# Patient Record
Sex: FEMALE | Race: WHITE
[De-identification: names, ages, dates, MRNs, and addresses within clinical notes are randomized per-mention and may not be internally consistent; named-entity substitution may affect disease eponyms.]

---

## 2020-02-18 ENCOUNTER — TRANSCRIPTION ENCOUNTER (OUTPATIENT)
Age: 60
End: 2020-02-18

## 2023-05-01 PROBLEM — Z00.00 ENCOUNTER FOR PREVENTIVE HEALTH EXAMINATION: Status: ACTIVE | Noted: 2023-05-01

## 2023-05-04 ENCOUNTER — NON-APPOINTMENT (OUTPATIENT)
Age: 63
End: 2023-05-04

## 2023-05-05 ENCOUNTER — NON-APPOINTMENT (OUTPATIENT)
Age: 63
End: 2023-05-05

## 2023-05-08 ENCOUNTER — NON-APPOINTMENT (OUTPATIENT)
Age: 63
End: 2023-05-08

## 2023-05-08 ENCOUNTER — APPOINTMENT (OUTPATIENT)
Dept: RADIATION ONCOLOGY | Facility: CLINIC | Age: 63
End: 2023-05-08
Payer: COMMERCIAL

## 2023-05-08 VITALS
HEART RATE: 80 BPM | RESPIRATION RATE: 19 BRPM | SYSTOLIC BLOOD PRESSURE: 132 MMHG | OXYGEN SATURATION: 99 % | WEIGHT: 177 LBS | DIASTOLIC BLOOD PRESSURE: 70 MMHG

## 2023-05-08 DIAGNOSIS — C50.512 MALIGNANT NEOPLASM OF LOWER-OUTER QUADRANT OF LEFT FEMALE BREAST: ICD-10-CM

## 2023-05-08 DIAGNOSIS — H04.129 DRY EYE SYNDROME OF UNSPECIFIED LACRIMAL GLAND: ICD-10-CM

## 2023-05-08 DIAGNOSIS — Z17.0 MALIGNANT NEOPLASM OF LOWER-OUTER QUADRANT OF LEFT FEMALE BREAST: ICD-10-CM

## 2023-05-08 DIAGNOSIS — Z80.7 FAMILY HISTORY OF OTHER MALIGNANT NEOPLASMS OF LYMPHOID, HEMATOPOIETIC AND RELATED TISSUES: ICD-10-CM

## 2023-05-08 DIAGNOSIS — Z80.3 FAMILY HISTORY OF MALIGNANT NEOPLASM OF BREAST: ICD-10-CM

## 2023-05-08 DIAGNOSIS — Z80.6 FAMILY HISTORY OF LEUKEMIA: ICD-10-CM

## 2023-05-08 PROCEDURE — 99204 OFFICE O/P NEW MOD 45 MIN: CPT

## 2023-05-08 NOTE — VITALS
[Maximal Pain Intensity: 3/10] : 3/10 [Least Pain Intensity: 0/10] : 0/10 [Pain Location: ___] : Pain Location: [unfilled] [OTC] : OTC [90: Able to carry normal activity; minor signs or symptoms of disease.] : 90: Able to carry normal activity; minor signs or symptoms of disease.  [Date: ____________] : Patient's last distress assessment performed on [unfilled]. [5 - Distress Level] : Distress Level: 5 [Referred Patient  to social work for follow-up] : Patient was referred to social work for follow-up [Patient given social work contact information and resource sheet] : Patient was given social work contact information and resource sheet

## 2023-05-08 NOTE — HISTORY OF PRESENT ILLNESS
[FreeTextEntry1] : The patient is a pleasant 62 year old female diagnosed with left breast cancer referred by Dr. Laura.\par \par Routine mammogram 4/13/23 showed cluster of indeterminate calcifications right upper outer quadrant. Stereotactic biopsy recommended. Left breast 6:00 spiculated mass- biopsy recommended.\par \par Biopsy  4/18/23 \par Right breast at 10:00 axis revealed cystic apocrine papillary hyperplasia ( fibrocystic changes) with oxalate type microcalcifications. \par Left breast 6:00 N5 Infiltrating Lobular carcinoma, moderately differentiated, at least 0.7 cm, present in multiple fragments. Grade 2 focal lobular carcinoma in situ, classic type. ER %, NE %, HER2 Negative.Ki-67 = 10%\par Left axillary lymph node biopsy benign. \par \par Breast MRI 5/5/23 showed  Left 6:00, N5 1.1 cm irregular enhancing mass, corresponding to biopsy proven invasive Lobular carcinoma. Right 7:00 N5 1.2 cm clumped nonmass enhancement, suspicious, non demonstrated on US, MRI biopsy recommended and R breast biopsy scheduled for 5/10/23. Left upper sternum 0.9 cm enhancing lesion is indeterminate. 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 Her son is getting  Memorial day weekend. 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  She walks for exercise and presents to discuss the role of radiotherapy in her care.\par \par \par

## 2023-05-08 NOTE — PHYSICAL EXAM
[General Appearance - Well Developed] : well developed [Symmetric] : breasts are symmetric [Breast Palpation Mass] : no palpable masses [Breast Abnormal Lactation (Galactorrhea)] : no nipple discharge [No UE Edema] : there is no upper extremity edema [Supraclavicular Lymph Nodes Enlarged Bilaterally] : supraclavicular [Axillary Lymph Nodes Enlarged Bilaterally] : axillary [Normal] : oriented to person, place and time, the affect was normal, the mood was normal and not anxious [Oriented To Time, Place, And Person] : oriented to person, place, and time [Affect] : the affect was normal [de-identified] : Size D

## 2023-05-14 ENCOUNTER — TRANSCRIPTION ENCOUNTER (OUTPATIENT)
Age: 63
End: 2023-05-14

## 2023-08-09 ENCOUNTER — INPATIENT (INPATIENT)
Facility: HOSPITAL | Age: 63
LOS: 1 days | Discharge: HOME HEALTH | DRG: 520 | End: 2023-08-11
Attending: FAMILY MEDICINE | Admitting: ORTHOPAEDIC SURGERY
Payer: COMMERCIAL

## 2023-08-09 VITALS — HEIGHT: 62 IN | WEIGHT: 175.05 LBS

## 2023-08-09 DIAGNOSIS — M54.9 DORSALGIA, UNSPECIFIED: ICD-10-CM

## 2023-08-09 DIAGNOSIS — Z98.890 OTHER SPECIFIED POSTPROCEDURAL STATES: Chronic | ICD-10-CM

## 2023-08-09 LAB
ALBUMIN SERPL ELPH-MCNC: 4.1 G/DL — SIGNIFICANT CHANGE UP (ref 3.3–5)
ALP SERPL-CCNC: 91 U/L — SIGNIFICANT CHANGE UP (ref 40–120)
ALT FLD-CCNC: 35 U/L — SIGNIFICANT CHANGE UP (ref 12–78)
ANION GAP SERPL CALC-SCNC: 5 MMOL/L — SIGNIFICANT CHANGE UP (ref 5–17)
APTT BLD: 30.5 SEC — SIGNIFICANT CHANGE UP (ref 24.5–35.6)
AST SERPL-CCNC: 15 U/L — SIGNIFICANT CHANGE UP (ref 15–37)
BASOPHILS # BLD AUTO: 0.05 K/UL — SIGNIFICANT CHANGE UP (ref 0–0.2)
BASOPHILS NFR BLD AUTO: 0.8 % — SIGNIFICANT CHANGE UP (ref 0–2)
BILIRUB SERPL-MCNC: 0.2 MG/DL — SIGNIFICANT CHANGE UP (ref 0.2–1.2)
BLD GP AB SCN SERPL QL: SIGNIFICANT CHANGE UP
BUN SERPL-MCNC: 23 MG/DL — SIGNIFICANT CHANGE UP (ref 7–23)
CALCIUM SERPL-MCNC: 9.4 MG/DL — SIGNIFICANT CHANGE UP (ref 8.5–10.1)
CHLORIDE SERPL-SCNC: 107 MMOL/L — SIGNIFICANT CHANGE UP (ref 96–108)
CO2 SERPL-SCNC: 28 MMOL/L — SIGNIFICANT CHANGE UP (ref 22–31)
CREAT SERPL-MCNC: 0.93 MG/DL — SIGNIFICANT CHANGE UP (ref 0.5–1.3)
EGFR: 70 ML/MIN/1.73M2 — SIGNIFICANT CHANGE UP
EOSINOPHIL # BLD AUTO: 0.19 K/UL — SIGNIFICANT CHANGE UP (ref 0–0.5)
EOSINOPHIL NFR BLD AUTO: 3 % — SIGNIFICANT CHANGE UP (ref 0–6)
GLUCOSE SERPL-MCNC: 86 MG/DL — SIGNIFICANT CHANGE UP (ref 70–99)
HCG SERPL-ACNC: 2 MIU/ML — SIGNIFICANT CHANGE UP
HCT VFR BLD CALC: 37.9 % — SIGNIFICANT CHANGE UP (ref 34.5–45)
HGB BLD-MCNC: 12.9 G/DL — SIGNIFICANT CHANGE UP (ref 11.5–15.5)
IMM GRANULOCYTES NFR BLD AUTO: 0.2 % — SIGNIFICANT CHANGE UP (ref 0–0.9)
INR BLD: 0.9 RATIO — SIGNIFICANT CHANGE UP (ref 0.85–1.18)
LYMPHOCYTES # BLD AUTO: 1.92 K/UL — SIGNIFICANT CHANGE UP (ref 1–3.3)
LYMPHOCYTES # BLD AUTO: 30.3 % — SIGNIFICANT CHANGE UP (ref 13–44)
MCHC RBC-ENTMCNC: 30.1 PG — SIGNIFICANT CHANGE UP (ref 27–34)
MCHC RBC-ENTMCNC: 34 GM/DL — SIGNIFICANT CHANGE UP (ref 32–36)
MCV RBC AUTO: 88.3 FL — SIGNIFICANT CHANGE UP (ref 80–100)
MONOCYTES # BLD AUTO: 0.4 K/UL — SIGNIFICANT CHANGE UP (ref 0–0.9)
MONOCYTES NFR BLD AUTO: 6.3 % — SIGNIFICANT CHANGE UP (ref 2–14)
NEUTROPHILS # BLD AUTO: 3.76 K/UL — SIGNIFICANT CHANGE UP (ref 1.8–7.4)
NEUTROPHILS NFR BLD AUTO: 59.4 % — SIGNIFICANT CHANGE UP (ref 43–77)
PLATELET # BLD AUTO: 274 K/UL — SIGNIFICANT CHANGE UP (ref 150–400)
POTASSIUM SERPL-MCNC: 4.2 MMOL/L — SIGNIFICANT CHANGE UP (ref 3.5–5.3)
POTASSIUM SERPL-SCNC: 4.2 MMOL/L — SIGNIFICANT CHANGE UP (ref 3.5–5.3)
PROT SERPL-MCNC: 7.8 GM/DL — SIGNIFICANT CHANGE UP (ref 6–8.3)
PROTHROM AB SERPL-ACNC: 10.2 SEC — SIGNIFICANT CHANGE UP (ref 9.5–13)
RBC # BLD: 4.29 M/UL — SIGNIFICANT CHANGE UP (ref 3.8–5.2)
RBC # FLD: 13 % — SIGNIFICANT CHANGE UP (ref 10.3–14.5)
SODIUM SERPL-SCNC: 140 MMOL/L — SIGNIFICANT CHANGE UP (ref 135–145)
WBC # BLD: 6.33 K/UL — SIGNIFICANT CHANGE UP (ref 3.8–10.5)
WBC # FLD AUTO: 6.33 K/UL — SIGNIFICANT CHANGE UP (ref 3.8–10.5)

## 2023-08-09 PROCEDURE — A9579: CPT

## 2023-08-09 PROCEDURE — 72158 MRI LUMBAR SPINE W/O & W/DYE: CPT | Mod: 26

## 2023-08-09 PROCEDURE — 86850 RBC ANTIBODY SCREEN: CPT

## 2023-08-09 PROCEDURE — 36415 COLL VENOUS BLD VENIPUNCTURE: CPT

## 2023-08-09 PROCEDURE — 85027 COMPLETE CBC AUTOMATED: CPT

## 2023-08-09 PROCEDURE — 85610 PROTHROMBIN TIME: CPT

## 2023-08-09 PROCEDURE — 88304 TISSUE EXAM BY PATHOLOGIST: CPT

## 2023-08-09 PROCEDURE — 76000 FLUOROSCOPY <1 HR PHYS/QHP: CPT

## 2023-08-09 PROCEDURE — 71045 X-RAY EXAM CHEST 1 VIEW: CPT | Mod: 26

## 2023-08-09 PROCEDURE — 71045 X-RAY EXAM CHEST 1 VIEW: CPT

## 2023-08-09 PROCEDURE — 72100 X-RAY EXAM L-S SPINE 2/3 VWS: CPT | Mod: 26

## 2023-08-09 PROCEDURE — 84702 CHORIONIC GONADOTROPIN TEST: CPT

## 2023-08-09 PROCEDURE — 85025 COMPLETE CBC W/AUTO DIFF WBC: CPT

## 2023-08-09 PROCEDURE — 93005 ELECTROCARDIOGRAM TRACING: CPT

## 2023-08-09 PROCEDURE — 80053 COMPREHEN METABOLIC PANEL: CPT

## 2023-08-09 PROCEDURE — 72158 MRI LUMBAR SPINE W/O & W/DYE: CPT | Mod: MA

## 2023-08-09 PROCEDURE — 85730 THROMBOPLASTIN TIME PARTIAL: CPT

## 2023-08-09 PROCEDURE — 99285 EMERGENCY DEPT VISIT HI MDM: CPT

## 2023-08-09 PROCEDURE — 86900 BLOOD TYPING SEROLOGIC ABO: CPT

## 2023-08-09 PROCEDURE — C1889: CPT

## 2023-08-09 PROCEDURE — 86803 HEPATITIS C AB TEST: CPT

## 2023-08-09 PROCEDURE — 80048 BASIC METABOLIC PNL TOTAL CA: CPT

## 2023-08-09 PROCEDURE — 72100 X-RAY EXAM L-S SPINE 2/3 VWS: CPT

## 2023-08-09 PROCEDURE — 86901 BLOOD TYPING SEROLOGIC RH(D): CPT

## 2023-08-09 RX ORDER — CYCLOSPORINE 0.5 MG/ML
1 EMULSION OPHTHALMIC
Refills: 0 | DISCHARGE

## 2023-08-09 RX ORDER — CHOLECALCIFEROL (VITAMIN D3) 125 MCG
1 CAPSULE ORAL
Refills: 0 | DISCHARGE

## 2023-08-09 RX ORDER — ACETAMINOPHEN 500 MG
1000 TABLET ORAL EVERY 8 HOURS
Refills: 0 | Status: DISCONTINUED | OUTPATIENT
Start: 2023-08-09 | End: 2023-08-11

## 2023-08-09 RX ORDER — ANASTROZOLE 1 MG/1
1 TABLET ORAL
Refills: 0 | DISCHARGE

## 2023-08-09 RX ORDER — MORPHINE SULFATE 50 MG/1
4 CAPSULE, EXTENDED RELEASE ORAL ONCE
Refills: 0 | Status: DISCONTINUED | OUTPATIENT
Start: 2023-08-09 | End: 2023-08-09

## 2023-08-09 RX ORDER — HYDROMORPHONE HYDROCHLORIDE 2 MG/ML
0.5 INJECTION INTRAMUSCULAR; INTRAVENOUS; SUBCUTANEOUS EVERY 4 HOURS
Refills: 0 | Status: DISCONTINUED | OUTPATIENT
Start: 2023-08-09 | End: 2023-08-11

## 2023-08-09 RX ORDER — PREGABALIN 225 MG/1
2 CAPSULE ORAL
Refills: 0 | DISCHARGE

## 2023-08-09 RX ORDER — CALCIUM CARBONATE 500(1250)
1 TABLET ORAL
Refills: 0 | DISCHARGE

## 2023-08-09 RX ORDER — CYCLOBENZAPRINE HYDROCHLORIDE 10 MG/1
5 TABLET, FILM COATED ORAL THREE TIMES A DAY
Refills: 0 | Status: DISCONTINUED | OUTPATIENT
Start: 2023-08-09 | End: 2023-08-11

## 2023-08-09 RX ORDER — ACETAMINOPHEN 500 MG
1000 TABLET ORAL ONCE
Refills: 0 | Status: COMPLETED | OUTPATIENT
Start: 2023-08-09 | End: 2023-08-09

## 2023-08-09 RX ORDER — MORPHINE SULFATE 50 MG/1
2 CAPSULE, EXTENDED RELEASE ORAL EVERY 6 HOURS
Refills: 0 | Status: DISCONTINUED | OUTPATIENT
Start: 2023-08-09 | End: 2023-08-09

## 2023-08-09 RX ORDER — OXYCODONE HYDROCHLORIDE 5 MG/1
5 TABLET ORAL EVERY 4 HOURS
Refills: 0 | Status: DISCONTINUED | OUTPATIENT
Start: 2023-08-09 | End: 2023-08-11

## 2023-08-09 RX ORDER — HYDROMORPHONE HYDROCHLORIDE 2 MG/ML
0.5 INJECTION INTRAMUSCULAR; INTRAVENOUS; SUBCUTANEOUS EVERY 4 HOURS
Refills: 0 | Status: DISCONTINUED | OUTPATIENT
Start: 2023-08-09 | End: 2023-08-09

## 2023-08-09 RX ORDER — OXYCODONE HYDROCHLORIDE 5 MG/1
10 TABLET ORAL EVERY 4 HOURS
Refills: 0 | Status: DISCONTINUED | OUTPATIENT
Start: 2023-08-09 | End: 2023-08-11

## 2023-08-09 RX ADMIN — Medication 1000 MILLIGRAM(S): at 15:00

## 2023-08-09 RX ADMIN — Medication 1000 MILLIGRAM(S): at 21:31

## 2023-08-09 RX ADMIN — MORPHINE SULFATE 2 MILLIGRAM(S): 50 CAPSULE, EXTENDED RELEASE ORAL at 15:56

## 2023-08-09 RX ADMIN — MORPHINE SULFATE 4 MILLIGRAM(S): 50 CAPSULE, EXTENDED RELEASE ORAL at 12:02

## 2023-08-09 RX ADMIN — MORPHINE SULFATE 4 MILLIGRAM(S): 50 CAPSULE, EXTENDED RELEASE ORAL at 14:20

## 2023-08-09 RX ADMIN — CYCLOBENZAPRINE HYDROCHLORIDE 5 MILLIGRAM(S): 10 TABLET, FILM COATED ORAL at 18:22

## 2023-08-09 RX ADMIN — HYDROMORPHONE HYDROCHLORIDE 0.5 MILLIGRAM(S): 2 INJECTION INTRAMUSCULAR; INTRAVENOUS; SUBCUTANEOUS at 21:46

## 2023-08-09 RX ADMIN — HYDROMORPHONE HYDROCHLORIDE 0.5 MILLIGRAM(S): 2 INJECTION INTRAMUSCULAR; INTRAVENOUS; SUBCUTANEOUS at 21:31

## 2023-08-09 RX ADMIN — MORPHINE SULFATE 2 MILLIGRAM(S): 50 CAPSULE, EXTENDED RELEASE ORAL at 15:41

## 2023-08-09 RX ADMIN — OXYCODONE HYDROCHLORIDE 10 MILLIGRAM(S): 5 TABLET ORAL at 18:59

## 2023-08-09 RX ADMIN — Medication 1000 MILLIGRAM(S): at 22:01

## 2023-08-09 RX ADMIN — Medication 400 MILLIGRAM(S): at 14:28

## 2023-08-09 RX ADMIN — OXYCODONE HYDROCHLORIDE 10 MILLIGRAM(S): 5 TABLET ORAL at 18:21

## 2023-08-09 NOTE — H&P ADULT - NSHPLABSRESULTS_GEN_ALL_CORE
VITAL SIGNS:  T(C): 36.8 (08-09-23 @ 09:39), Max: 36.8 (08-09-23 @ 09:39)  HR: 78 (08-09-23 @ 09:39) (78 - 78)  BP: 145/75 (08-09-23 @ 09:39) (145/75 - 145/75)  RR: 18 (08-09-23 @ 09:39) (18 - 18)  SpO2: 100% (08-09-23 @ 09:39) (100% - 100%)

## 2023-08-09 NOTE — ED ADULT TRIAGE NOTE - CHIEF COMPLAINT QUOTE
pt sent to ed by dr veloz for pain control ,MRI and admission. pt reports she has herniated disk with severe lumbar pain unrelieved by Percocet. denies urinary issues.

## 2023-08-09 NOTE — H&P ADULT - NSHPPHYSICALEXAM_GEN_ALL_CORE
PHYSICAL EXAM  GEN: NAD, AAOx3  SPINE:  Skin intact  TTP over the Right-sided paraspinal musculature of the lumbar spine; Non-TTP over the Left-sided paraspinal musculature of the lumbar spine or bilateral cervical or thoracic spine.   Non-TTP over the bony prominences of the cervical, thoracic, or lumbar spine.   No bony step-offs.   Grossly moving all extremities; SILT throughout all extremities.   + Radial pulses bilaterally; + DP/PT pulses bilaterally.   No saddle anesthesia.     MOTOR EXAM:                          Elbow Flex (C5)     Wrist Ext (C6)     Elbow Ext (C7)      Finger Flex (C8)    Finger Abduction (T1)  RIGHT                 5/5                         5/5                         5/5                          5/5                              5/5  LEFT                    5/5                         5/5                         5/5                          5/5                              5/5                           Hip Flex (L2)      Knee Ext (L3)      Ank Dorsiflex (L4)     Hallux Ext (L5)     Ank PlantarFlex (S1)  RIGHT               4/5                      4/5                          5/5                            5/5                           5/5  LEFT                  5/5                      5/5                          5/5                            5/5                           5/5      SENSORY EXAM:                        C5      C6      C7      C8       T1          RIGHT          2         2        2         2         2          (0=absent, 1=impaired, 2=normal, NT=not testable)  LEFT             2         2        2         2         2          (0=absent, 1=impaired, 2=normal, NT=not testable)                        L2        L3       L4      L5       S1          RIGHT        2          2         2        2        2           (0=absent, 1=impaired, 2=normal, NT=not testable)  LEFT           2          2         2        2        2           (0=absent, 1=impaired, 2=normal, NT=not testable)    Negative Anderson's. Babinski, and Myoclonus bilaterally. PHYSICAL EXAM  GEN: NAD, AAOx3  SPINE:  Skin intact  TTP over the Right-sided paraspinal musculature of the lumbar spine; Non-TTP over the Left-sided paraspinal musculature of the lumbar spine or bilateral cervical or thoracic spine.   Non-TTP over the bony prominences of the cervical, thoracic, or lumbar spine.   No bony step-offs.   Grossly moving all extremities; SILT throughout all extremities.   + Radial pulses bilaterally; + DP/PT pulses bilaterally.   No saddle anesthesia.     MOTOR EXAM:                          Elbow Flex (C5)     Wrist Ext (C6)     Elbow Ext (C7)      Finger Flex (C8)    Finger Abduction (T1)  RIGHT                 5/5                         5/5                         5/5                          5/5                              5/5  LEFT                    5/5                         5/5                         5/5                          5/5                              5/5                           Hip Flex (L2)      Knee Ext (L3)      Ank Dorsiflex (L4)     Hallux Ext (L5)     Ank PlantarFlex (S1)  RIGHT               4/5                      4/5                          5/5                            5/5                           5/5  LEFT                  5/5                      5/5                          5/5                            5/5                           5/5      SENSORY EXAM:                        C5      C6      C7      C8       T1          RIGHT          2         2        2         2         2          (0=absent, 1=impaired, 2=normal, NT=not testable)  LEFT             2         2        2         2         2          (0=absent, 1=impaired, 2=normal, NT=not testable)                        L2        L3       L4      L5       S1          RIGHT        2          2         2        1        1           (0=absent, 1=impaired, 2=normal, NT=not testable)  LEFT           2          2         2        2        2           (0=absent, 1=impaired, 2=normal, NT=not testable)    Negative Anderson's. Babinski, and Myoclonus bilaterally.

## 2023-08-09 NOTE — ED PROVIDER NOTE - NS ED MD TWO NIGHTS YN
Please ask patient to increase hydrochlorothiazide to two pills a day and then have him come back in two weeks for a recheck, he will also need labs at the time. Thanks   Yes

## 2023-08-09 NOTE — ED STATDOCS - OBJECTIVE STATEMENT
62 yo f with back pain sdent to ed by her spine specialist Dr. Mcleod.  Patient having acute on chronic back pain and sent in for mri and possible OR. No bowel or bladder incontinence.

## 2023-08-09 NOTE — ED STATDOCS - CLINICAL SUMMARY MEDICAL DECISION MAKING FREE TEXT BOX
Middle aged f sent to the ed for admission, mri and pain management. Ortho already at bedside, will obtain admissions labs,. pain meds and admitt.

## 2023-08-09 NOTE — ED ADULT NURSE NOTE - NSFALLHARMRISKINTERV_ED_ALL_ED

## 2023-08-09 NOTE — PATIENT PROFILE ADULT - VISION (WITH CORRECTIVE LENSES IF THE PATIENT USUALLY WEARS THEM):
wears glasses for everything/Severely impaired: cannot locate objects without hearing or touching them or patient nonresponsive.

## 2023-08-09 NOTE — PATIENT PROFILE ADULT - FALL HARM RISK - HARM RISK INTERVENTIONS
Assistance with ambulation/Assistance OOB with selected safe patient handling equipment/Communicate Risk of Fall with Harm to all staff/Discuss with provider need for PT consult/Monitor gait and stability/Reinforce activity limits and safety measures with patient and family/Review medications for side effects contributing to fall risk/Sit up slowly, dangle for a short time, stand at bedside before walking/Tailored Fall Risk Interventions/Toileting schedule using arm’s reach rule for commode and bathroom/Visual Cue: Yellow wristband and red socks/Bed in lowest position, wheels locked, appropriate side rails in place/Call bell, personal items and telephone in reach/Instruct patient to call for assistance before getting out of bed or chair/Non-slip footwear when patient is out of bed/[place] to call system/Physically safe environment - no spills, clutter or unnecessary equipment/Purposeful Proactive Rounding/Room/bathroom lighting operational, light cord in reach

## 2023-08-09 NOTE — ED ADULT NURSE NOTE - OBJECTIVE STATEMENT
patient came to ED, from physician office for back pain not being controlled  at home with pain medications.

## 2023-08-09 NOTE — H&P ADULT - HISTORY OF PRESENT ILLNESS
Orthopaedic Surgery - Spine Service     Patient is a 63F community-ambulator without assistive devices who presents to the [facility] ED with a chief complaint of Right-sided lower back pain radiating down the Right leg. Patient states that she has been having approximately four weeks of unremitting lower Right-sided back pain and "burning" Right buttock pain radiating down the back of her thigh and calf to her foot. Of note, patient bears a recent diagnosis of breast cancer (diagnosed in April 2023) and is status-post bilateral mastectomies and JAMIR (June 2023). Patient states she experienced significant position-related (recumbent)  back stiffness throughout her postoperative recovery which she believes exacerbated her prior spinal pathology. Patient elaborates that she sustained a Right-sided L3-L4 herniated disc in 1998 and subsequently underwent a successful microdiscectomy. Patient has had no back-pain related issues in the interim until the onset of current symptoms described above pertaining to her current presentation. Patient states that her prior presentation did not include radiation to the foot whereas her current presentation does. Patient presents with the compact disc for an outpatient MR Lumbar Spine study (without contrast) from 8/4/23; patient states she was informed that the study demonstrated a recurrent Right-sided L3-L4 disc herniation. Patient denies any recent or frequent falls, head-strikes, losses of consciousness, Patient unable to identify any perceived traumas or exertional stressors that she believes could have precipitated re-herniation. Patient remains able to ambulate independently; however, states pain is worse with ambulation, particularly when descending stairs. Patient denies weakness, numbness, or tingling accompanying the aforementioned radicular pain in the Right lower extremity; patient otherwise denies pain, weakness, numbness, or tingling in the bilateral upper and Left lower extremities. Denies having any other pain elsewhere. Denies any previous orthopaedic history aside from remote outpatient follow-up for an arthritic Right shoulder, which is not currently bothering. Denies fevers, chills, headaches, chest pain, shortness of breath, nausea, vomiting, urinary or fecal incontinence, saddle anesthesia, or any additional orthopaedic concern or complaints at this time.     Orthopaedic Surgery - Spine Service     Patient is a 63F community-ambulator without assistive devices who presents to the [facility] ED with a chief complaint of Right-sided lower back pain radiating down the Right leg. Patient states that she has been having approximately four weeks of unremitting lower Right-sided back pain and "burning" Right buttock pain radiating down the back of her thigh and calf to her foot. Of note, patient bears a recent diagnosis of breast cancer (diagnosed in April 2023) and is status-post bilateral mastectomies and JAMIR (June 2023). Patient states she experienced significant position-related (recumbent)  back stiffness throughout her postoperative recovery which she believes exacerbated her prior spinal pathology. Patient elaborates that she sustained a Right-sided L3-L4 herniated disc in 1998 and subsequently underwent a successful microdiscectomy. Patient has had no back-pain related issues in the interim until the onset of current symptoms described above pertaining to her current presentation. Patient states that her prior presentation did not include radiation to the foot whereas her current presentation does. Patient presents with the compact disc for an outpatient MR Lumbar Spine study (without contrast) from 8/4/23; patient states she was informed that the study demonstrated a lumbar disc herniation. Patient denies any recent or frequent falls, head-strikes, losses of consciousness, Patient unable to identify any perceived traumas or exertional stressors that she believes could have precipitated re-herniation. Patient remains able to ambulate independently; however, states pain is worse with ambulation, particularly when descending stairs. Patient denies weakness, numbness, or tingling accompanying the aforementioned radicular pain in the Right lower extremity; patient otherwise denies pain, weakness, numbness, or tingling in the bilateral upper and Left lower extremities. Denies having any other pain elsewhere. Denies any previous orthopaedic history aside from remote outpatient follow-up for an arthritic Right shoulder, which is not currently bothering. Denies fevers, chills, headaches, chest pain, shortness of breath, nausea, vomiting, urinary or fecal incontinence, saddle anesthesia, or any additional orthopaedic concern or complaints at this time.

## 2023-08-09 NOTE — H&P ADULT - ASSESSMENT
ASSESSMENT:  Patient is a 63F with unremitting Right-sided lower back and Right lower extremity radicular pain with a recurrent L3-4 herniated disc reported on outpatient MR Lumbar Spine imaging.       PLAN:  - Admit to Orthopaedic Spine Service (Dr. Mcleod).   - Follow up MR Lumbar Spine WITH Contrast.   - Pain control.   - NPO after except medications pending MRI.   - Please hold chemical DVT Ppx; Venodynes may be used.   - CBC/BMP/PT/PTT/T&S/ECG/CXR.   - No acute orthopaedic surgical intervention indicated at this time; Anticipate likely OR.   - All questions answered and concerns addressed to patient's satisfaction. Patient/family understand and agree with plan.  - Will discuss with Shani and advise if plan changes.     ASSESSMENT:  Patient is a 63F with unremitting Right-sided lower back and Right lower extremity radicular pain with a L5-S1 herniated disc reported on outpatient MR Lumbar Spine imaging.       PLAN:  - Admit to Orthopaedic Spine Service (Dr. Mcleod).   - Follow up MR Lumbar Spine WITH Contrast.   - Plan for OR on Friday 8/11 for surgical intervention.   - Please document Medical/Other clearance(s)/optimization as needed.   - Pain control.   - Patient ambulatory, no DVT prophylaxis required at present.   - WBAT.   - CBC/BMP/PT/PTT/T&S/ECG/CXR.   - All questions answered and concerns addressed to patient's satisfaction. Patient/family understand and agree with plan.  - Will discuss with Shani and advise if plan changes.

## 2023-08-09 NOTE — ED STATDOCS - PHYSICAL EXAMINATION
Improved
Constitutional: NAD   Eyes: PERRLA  Head: Normocephalic   Mouth: MMM  Cardiac: regular rate   Resp: Lungs CTAB  GI: Abd s/nt/nd, no rebound or guarding.  Neuro: awake, alert, moving all extremities  Skin: No rashes  msk: spine mildly tender to palpation, ambulates with steady gait

## 2023-08-09 NOTE — ED STATDOCS - PROGRESS NOTE DETAILS
62 yo female with a PMH of chronic back pain, herniated disks presents with worsening lower back pain. Pt had a MRI on friday without contrast which showed her L spine herniated disk. Pt f/u with Dr. Mcleod from ortho spine and was advised to come in for a MRI with contrast and admission for her pain.   Ortho at bedside examining pt. Pt to be admitted to Dr. Mcleod. -Kane Parker PA-C

## 2023-08-10 LAB
ANION GAP SERPL CALC-SCNC: 5 MMOL/L — SIGNIFICANT CHANGE UP (ref 5–17)
APTT BLD: 31.7 SEC — SIGNIFICANT CHANGE UP (ref 24.5–35.6)
BUN SERPL-MCNC: 25 MG/DL — HIGH (ref 7–23)
CALCIUM SERPL-MCNC: 9.1 MG/DL — SIGNIFICANT CHANGE UP (ref 8.5–10.1)
CHLORIDE SERPL-SCNC: 107 MMOL/L — SIGNIFICANT CHANGE UP (ref 96–108)
CO2 SERPL-SCNC: 27 MMOL/L — SIGNIFICANT CHANGE UP (ref 22–31)
CREAT SERPL-MCNC: 0.75 MG/DL — SIGNIFICANT CHANGE UP (ref 0.5–1.3)
EGFR: 91 ML/MIN/1.73M2 — SIGNIFICANT CHANGE UP
GLUCOSE SERPL-MCNC: 96 MG/DL — SIGNIFICANT CHANGE UP (ref 70–99)
HCT VFR BLD CALC: 37.2 % — SIGNIFICANT CHANGE UP (ref 34.5–45)
HCV AB S/CO SERPL IA: 0.15 S/CO — SIGNIFICANT CHANGE UP (ref 0–0.99)
HCV AB SERPL-IMP: SIGNIFICANT CHANGE UP
HGB BLD-MCNC: 12.5 G/DL — SIGNIFICANT CHANGE UP (ref 11.5–15.5)
INR BLD: 0.99 RATIO — SIGNIFICANT CHANGE UP (ref 0.85–1.18)
MCHC RBC-ENTMCNC: 29.6 PG — SIGNIFICANT CHANGE UP (ref 27–34)
MCHC RBC-ENTMCNC: 33.6 GM/DL — SIGNIFICANT CHANGE UP (ref 32–36)
MCV RBC AUTO: 87.9 FL — SIGNIFICANT CHANGE UP (ref 80–100)
PLATELET # BLD AUTO: 243 K/UL — SIGNIFICANT CHANGE UP (ref 150–400)
POTASSIUM SERPL-MCNC: 4 MMOL/L — SIGNIFICANT CHANGE UP (ref 3.5–5.3)
POTASSIUM SERPL-SCNC: 4 MMOL/L — SIGNIFICANT CHANGE UP (ref 3.5–5.3)
PROTHROM AB SERPL-ACNC: 11.2 SEC — SIGNIFICANT CHANGE UP (ref 9.5–13)
RBC # BLD: 4.23 M/UL — SIGNIFICANT CHANGE UP (ref 3.8–5.2)
RBC # FLD: 13.1 % — SIGNIFICANT CHANGE UP (ref 10.3–14.5)
SODIUM SERPL-SCNC: 139 MMOL/L — SIGNIFICANT CHANGE UP (ref 135–145)
WBC # BLD: 5.81 K/UL — SIGNIFICANT CHANGE UP (ref 3.8–10.5)
WBC # FLD AUTO: 5.81 K/UL — SIGNIFICANT CHANGE UP (ref 3.8–10.5)

## 2023-08-10 PROCEDURE — 99233 SBSQ HOSP IP/OBS HIGH 50: CPT

## 2023-08-10 PROCEDURE — 93010 ELECTROCARDIOGRAM REPORT: CPT

## 2023-08-10 RX ORDER — OXYCODONE HYDROCHLORIDE 5 MG/1
10 TABLET ORAL ONCE
Refills: 0 | Status: DISCONTINUED | OUTPATIENT
Start: 2023-08-11 | End: 2023-08-11

## 2023-08-10 RX ORDER — GABAPENTIN 400 MG/1
300 CAPSULE ORAL ONCE
Refills: 0 | Status: COMPLETED | OUTPATIENT
Start: 2023-08-11 | End: 2023-08-11

## 2023-08-10 RX ORDER — TRANEXAMIC ACID 100 MG/ML
1 INJECTION, SOLUTION INTRAVENOUS
Qty: 1000 | Refills: 0 | Status: DISCONTINUED | OUTPATIENT
Start: 2023-08-11 | End: 2023-08-11

## 2023-08-10 RX ORDER — CELECOXIB 200 MG/1
200 CAPSULE ORAL ONCE
Refills: 0 | Status: COMPLETED | OUTPATIENT
Start: 2023-08-11 | End: 2023-08-11

## 2023-08-10 RX ORDER — TRANEXAMIC ACID 100 MG/ML
1000 INJECTION, SOLUTION INTRAVENOUS ONCE
Refills: 0 | Status: DISCONTINUED | OUTPATIENT
Start: 2023-08-11 | End: 2023-08-11

## 2023-08-10 RX ORDER — SENNA PLUS 8.6 MG/1
1 TABLET ORAL DAILY
Refills: 0 | Status: DISCONTINUED | OUTPATIENT
Start: 2023-08-10 | End: 2023-08-11

## 2023-08-10 RX ORDER — POLYETHYLENE GLYCOL 3350 17 G/17G
17 POWDER, FOR SOLUTION ORAL DAILY
Refills: 0 | Status: DISCONTINUED | OUTPATIENT
Start: 2023-08-10 | End: 2023-08-11

## 2023-08-10 RX ADMIN — HYDROMORPHONE HYDROCHLORIDE 0.5 MILLIGRAM(S): 2 INJECTION INTRAMUSCULAR; INTRAVENOUS; SUBCUTANEOUS at 12:33

## 2023-08-10 RX ADMIN — Medication 1000 MILLIGRAM(S): at 06:29

## 2023-08-10 RX ADMIN — POLYETHYLENE GLYCOL 3350 17 GRAM(S): 17 POWDER, FOR SOLUTION ORAL at 13:07

## 2023-08-10 RX ADMIN — HYDROMORPHONE HYDROCHLORIDE 0.5 MILLIGRAM(S): 2 INJECTION INTRAMUSCULAR; INTRAVENOUS; SUBCUTANEOUS at 08:06

## 2023-08-10 RX ADMIN — OXYCODONE HYDROCHLORIDE 10 MILLIGRAM(S): 5 TABLET ORAL at 01:27

## 2023-08-10 RX ADMIN — OXYCODONE HYDROCHLORIDE 10 MILLIGRAM(S): 5 TABLET ORAL at 10:35

## 2023-08-10 RX ADMIN — Medication 1000 MILLIGRAM(S): at 14:44

## 2023-08-10 RX ADMIN — Medication 1000 MILLIGRAM(S): at 21:36

## 2023-08-10 RX ADMIN — HYDROMORPHONE HYDROCHLORIDE 0.5 MILLIGRAM(S): 2 INJECTION INTRAMUSCULAR; INTRAVENOUS; SUBCUTANEOUS at 14:48

## 2023-08-10 RX ADMIN — OXYCODONE HYDROCHLORIDE 10 MILLIGRAM(S): 5 TABLET ORAL at 20:50

## 2023-08-10 RX ADMIN — Medication 1000 MILLIGRAM(S): at 14:14

## 2023-08-10 RX ADMIN — OXYCODONE HYDROCHLORIDE 10 MILLIGRAM(S): 5 TABLET ORAL at 10:20

## 2023-08-10 RX ADMIN — SENNA PLUS 1 TABLET(S): 8.6 TABLET ORAL at 13:07

## 2023-08-10 RX ADMIN — Medication 1000 MILLIGRAM(S): at 06:30

## 2023-08-10 RX ADMIN — OXYCODONE HYDROCHLORIDE 10 MILLIGRAM(S): 5 TABLET ORAL at 20:20

## 2023-08-10 RX ADMIN — CYCLOBENZAPRINE HYDROCHLORIDE 5 MILLIGRAM(S): 10 TABLET, FILM COATED ORAL at 21:36

## 2023-08-10 RX ADMIN — HYDROMORPHONE HYDROCHLORIDE 0.5 MILLIGRAM(S): 2 INJECTION INTRAMUSCULAR; INTRAVENOUS; SUBCUTANEOUS at 08:21

## 2023-08-10 RX ADMIN — OXYCODONE HYDROCHLORIDE 10 MILLIGRAM(S): 5 TABLET ORAL at 04:44

## 2023-08-10 RX ADMIN — OXYCODONE HYDROCHLORIDE 10 MILLIGRAM(S): 5 TABLET ORAL at 00:57

## 2023-08-10 RX ADMIN — HYDROMORPHONE HYDROCHLORIDE 0.5 MILLIGRAM(S): 2 INJECTION INTRAMUSCULAR; INTRAVENOUS; SUBCUTANEOUS at 18:33

## 2023-08-10 RX ADMIN — OXYCODONE HYDROCHLORIDE 10 MILLIGRAM(S): 5 TABLET ORAL at 05:14

## 2023-08-10 RX ADMIN — HYDROMORPHONE HYDROCHLORIDE 0.5 MILLIGRAM(S): 2 INJECTION INTRAMUSCULAR; INTRAVENOUS; SUBCUTANEOUS at 12:48

## 2023-08-10 RX ADMIN — CYCLOBENZAPRINE HYDROCHLORIDE 5 MILLIGRAM(S): 10 TABLET, FILM COATED ORAL at 13:05

## 2023-08-11 ENCOUNTER — TRANSCRIPTION ENCOUNTER (OUTPATIENT)
Age: 63
End: 2023-08-11

## 2023-08-11 VITALS
HEART RATE: 82 BPM | RESPIRATION RATE: 17 BRPM | SYSTOLIC BLOOD PRESSURE: 154 MMHG | DIASTOLIC BLOOD PRESSURE: 75 MMHG | OXYGEN SATURATION: 92 % | TEMPERATURE: 98 F

## 2023-08-11 LAB
ANION GAP SERPL CALC-SCNC: 3 MMOL/L — LOW (ref 5–17)
ANION GAP SERPL CALC-SCNC: 6 MMOL/L — SIGNIFICANT CHANGE UP (ref 5–17)
APTT BLD: 30.3 SEC — SIGNIFICANT CHANGE UP (ref 24.5–35.6)
BUN SERPL-MCNC: 25 MG/DL — HIGH (ref 7–23)
BUN SERPL-MCNC: 29 MG/DL — HIGH (ref 7–23)
CALCIUM SERPL-MCNC: 8.6 MG/DL — SIGNIFICANT CHANGE UP (ref 8.5–10.1)
CALCIUM SERPL-MCNC: 9.1 MG/DL — SIGNIFICANT CHANGE UP (ref 8.5–10.1)
CHLORIDE SERPL-SCNC: 105 MMOL/L — SIGNIFICANT CHANGE UP (ref 96–108)
CHLORIDE SERPL-SCNC: 107 MMOL/L — SIGNIFICANT CHANGE UP (ref 96–108)
CO2 SERPL-SCNC: 26 MMOL/L — SIGNIFICANT CHANGE UP (ref 22–31)
CO2 SERPL-SCNC: 27 MMOL/L — SIGNIFICANT CHANGE UP (ref 22–31)
CREAT SERPL-MCNC: 0.81 MG/DL — SIGNIFICANT CHANGE UP (ref 0.5–1.3)
CREAT SERPL-MCNC: 0.98 MG/DL — SIGNIFICANT CHANGE UP (ref 0.5–1.3)
EGFR: 66 ML/MIN/1.73M2 — SIGNIFICANT CHANGE UP
EGFR: 83 ML/MIN/1.73M2 — SIGNIFICANT CHANGE UP
GLUCOSE SERPL-MCNC: 101 MG/DL — HIGH (ref 70–99)
GLUCOSE SERPL-MCNC: 133 MG/DL — HIGH (ref 70–99)
HCT VFR BLD CALC: 35.4 % — SIGNIFICANT CHANGE UP (ref 34.5–45)
HCT VFR BLD CALC: 39.6 % — SIGNIFICANT CHANGE UP (ref 34.5–45)
HGB BLD-MCNC: 12 G/DL — SIGNIFICANT CHANGE UP (ref 11.5–15.5)
HGB BLD-MCNC: 13.5 G/DL — SIGNIFICANT CHANGE UP (ref 11.5–15.5)
INR BLD: 1.01 RATIO — SIGNIFICANT CHANGE UP (ref 0.85–1.18)
MCHC RBC-ENTMCNC: 29.5 PG — SIGNIFICANT CHANGE UP (ref 27–34)
MCHC RBC-ENTMCNC: 29.6 PG — SIGNIFICANT CHANGE UP (ref 27–34)
MCHC RBC-ENTMCNC: 33.9 GM/DL — SIGNIFICANT CHANGE UP (ref 32–36)
MCHC RBC-ENTMCNC: 34.1 GM/DL — SIGNIFICANT CHANGE UP (ref 32–36)
MCV RBC AUTO: 86.8 FL — SIGNIFICANT CHANGE UP (ref 80–100)
MCV RBC AUTO: 87 FL — SIGNIFICANT CHANGE UP (ref 80–100)
PLATELET # BLD AUTO: 239 K/UL — SIGNIFICANT CHANGE UP (ref 150–400)
PLATELET # BLD AUTO: 240 K/UL — SIGNIFICANT CHANGE UP (ref 150–400)
POTASSIUM SERPL-MCNC: 3.9 MMOL/L — SIGNIFICANT CHANGE UP (ref 3.5–5.3)
POTASSIUM SERPL-MCNC: 4 MMOL/L — SIGNIFICANT CHANGE UP (ref 3.5–5.3)
POTASSIUM SERPL-SCNC: 3.9 MMOL/L — SIGNIFICANT CHANGE UP (ref 3.5–5.3)
POTASSIUM SERPL-SCNC: 4 MMOL/L — SIGNIFICANT CHANGE UP (ref 3.5–5.3)
PROTHROM AB SERPL-ACNC: 11.4 SEC — SIGNIFICANT CHANGE UP (ref 9.5–13)
RBC # BLD: 4.07 M/UL — SIGNIFICANT CHANGE UP (ref 3.8–5.2)
RBC # BLD: 4.56 M/UL — SIGNIFICANT CHANGE UP (ref 3.8–5.2)
RBC # FLD: 12.6 % — SIGNIFICANT CHANGE UP (ref 10.3–14.5)
RBC # FLD: 12.7 % — SIGNIFICANT CHANGE UP (ref 10.3–14.5)
SODIUM SERPL-SCNC: 137 MMOL/L — SIGNIFICANT CHANGE UP (ref 135–145)
SODIUM SERPL-SCNC: 137 MMOL/L — SIGNIFICANT CHANGE UP (ref 135–145)
WBC # BLD: 4.95 K/UL — SIGNIFICANT CHANGE UP (ref 3.8–10.5)
WBC # BLD: 7 K/UL — SIGNIFICANT CHANGE UP (ref 3.8–10.5)
WBC # FLD AUTO: 4.95 K/UL — SIGNIFICANT CHANGE UP (ref 3.8–10.5)
WBC # FLD AUTO: 7 K/UL — SIGNIFICANT CHANGE UP (ref 3.8–10.5)

## 2023-08-11 PROCEDURE — 99232 SBSQ HOSP IP/OBS MODERATE 35: CPT

## 2023-08-11 PROCEDURE — 88304 TISSUE EXAM BY PATHOLOGIST: CPT | Mod: 26

## 2023-08-11 RX ORDER — TRAMADOL HYDROCHLORIDE 50 MG/1
50 TABLET ORAL EVERY 6 HOURS
Refills: 0 | Status: DISCONTINUED | OUTPATIENT
Start: 2023-08-11 | End: 2023-08-11

## 2023-08-11 RX ORDER — ACETAMINOPHEN 500 MG
1000 TABLET ORAL ONCE
Refills: 0 | Status: COMPLETED | OUTPATIENT
Start: 2023-08-11 | End: 2023-08-11

## 2023-08-11 RX ORDER — OXYCODONE HYDROCHLORIDE 5 MG/1
1 TABLET ORAL
Qty: 20 | Refills: 0
Start: 2023-08-11 | End: 2023-08-15

## 2023-08-11 RX ORDER — CEFAZOLIN SODIUM 1 G
2000 VIAL (EA) INJECTION EVERY 8 HOURS
Refills: 0 | Status: COMPLETED | OUTPATIENT
Start: 2023-08-11 | End: 2023-08-12

## 2023-08-11 RX ORDER — FENTANYL CITRATE 50 UG/ML
50 INJECTION INTRAVENOUS
Refills: 0 | Status: DISCONTINUED | OUTPATIENT
Start: 2023-08-11 | End: 2023-08-11

## 2023-08-11 RX ORDER — HYDROMORPHONE HYDROCHLORIDE 2 MG/ML
0.5 INJECTION INTRAMUSCULAR; INTRAVENOUS; SUBCUTANEOUS
Refills: 0 | Status: DISCONTINUED | OUTPATIENT
Start: 2023-08-11 | End: 2023-08-11

## 2023-08-11 RX ORDER — MUPIROCIN 20 MG/G
1 OINTMENT TOPICAL
Refills: 0 | DISCHARGE

## 2023-08-11 RX ORDER — CELECOXIB 200 MG/1
1 CAPSULE ORAL
Refills: 0 | DISCHARGE

## 2023-08-11 RX ORDER — SODIUM CHLORIDE 9 MG/ML
1000 INJECTION, SOLUTION INTRAVENOUS
Refills: 0 | Status: DISCONTINUED | OUTPATIENT
Start: 2023-08-11 | End: 2023-08-11

## 2023-08-11 RX ORDER — ONDANSETRON 8 MG/1
4 TABLET, FILM COATED ORAL ONCE
Refills: 0 | Status: DISCONTINUED | OUTPATIENT
Start: 2023-08-11 | End: 2023-08-11

## 2023-08-11 RX ORDER — DOCUSATE SODIUM 100 MG
1 CAPSULE ORAL
Qty: 16 | Refills: 0
Start: 2023-08-11 | End: 2023-08-14

## 2023-08-11 RX ORDER — GABAPENTIN 400 MG/1
1 CAPSULE ORAL
Qty: 21 | Refills: 0
Start: 2023-08-11 | End: 2023-08-17

## 2023-08-11 RX ORDER — OXYCODONE HYDROCHLORIDE 5 MG/1
5 TABLET ORAL ONCE
Refills: 0 | Status: DISCONTINUED | OUTPATIENT
Start: 2023-08-11 | End: 2023-08-11

## 2023-08-11 RX ORDER — OXYCODONE AND ACETAMINOPHEN 5; 325 MG/1; MG/1
1 TABLET ORAL
Refills: 0 | DISCHARGE

## 2023-08-11 RX ORDER — ONDANSETRON 8 MG/1
1 TABLET, FILM COATED ORAL
Qty: 24 | Refills: 0
Start: 2023-08-11 | End: 2023-08-14

## 2023-08-11 RX ORDER — CYCLOBENZAPRINE HYDROCHLORIDE 10 MG/1
1 TABLET, FILM COATED ORAL
Qty: 42 | Refills: 0
Start: 2023-08-11 | End: 2023-08-24

## 2023-08-11 RX ADMIN — SODIUM CHLORIDE 100 MILLILITER(S): 9 INJECTION, SOLUTION INTRAVENOUS at 10:57

## 2023-08-11 RX ADMIN — Medication 1000 MILLIGRAM(S): at 05:49

## 2023-08-11 RX ADMIN — GABAPENTIN 300 MILLIGRAM(S): 400 CAPSULE ORAL at 05:38

## 2023-08-11 RX ADMIN — OXYCODONE HYDROCHLORIDE 10 MILLIGRAM(S): 5 TABLET ORAL at 03:49

## 2023-08-11 RX ADMIN — Medication 1000 MILLIGRAM(S): at 15:00

## 2023-08-11 RX ADMIN — HYDROMORPHONE HYDROCHLORIDE 0.5 MILLIGRAM(S): 2 INJECTION INTRAMUSCULAR; INTRAVENOUS; SUBCUTANEOUS at 01:22

## 2023-08-11 RX ADMIN — HYDROMORPHONE HYDROCHLORIDE 0.5 MILLIGRAM(S): 2 INJECTION INTRAMUSCULAR; INTRAVENOUS; SUBCUTANEOUS at 11:20

## 2023-08-11 RX ADMIN — CELECOXIB 200 MILLIGRAM(S): 200 CAPSULE ORAL at 05:38

## 2023-08-11 RX ADMIN — Medication 100 MILLIGRAM(S): at 14:59

## 2023-08-11 RX ADMIN — HYDROMORPHONE HYDROCHLORIDE 0.5 MILLIGRAM(S): 2 INJECTION INTRAMUSCULAR; INTRAVENOUS; SUBCUTANEOUS at 10:50

## 2023-08-11 RX ADMIN — HYDROMORPHONE HYDROCHLORIDE 0.5 MILLIGRAM(S): 2 INJECTION INTRAMUSCULAR; INTRAVENOUS; SUBCUTANEOUS at 00:52

## 2023-08-11 RX ADMIN — Medication 1000 MILLIGRAM(S): at 11:19

## 2023-08-11 RX ADMIN — OXYCODONE HYDROCHLORIDE 10 MILLIGRAM(S): 5 TABLET ORAL at 04:19

## 2023-08-11 RX ADMIN — Medication 400 MILLIGRAM(S): at 10:50

## 2023-08-11 RX ADMIN — Medication 1000 MILLIGRAM(S): at 05:37

## 2023-08-11 NOTE — DISCHARGE NOTE PROVIDER - NSDCFUADDINST_GEN_ALL_CORE_FT
1. Walk plenty  2. No lifting over 5 lbs  3. Keep bandage on, clean and dry. Change to a new one if gets wet or dirty. Ok to shower from waist down (neck surgery only). Make sure you have a bar to hold onto in the shower. If you had low back surgery, sponge bathe until cleared by your surgeon to shower.  4. Pain meds: eRx sent to your pharmacy electronically, you need to pick it up  5. No driving on pain meds  6. See your surgeon in about 10 days in the office. Call to schedule.

## 2023-08-11 NOTE — PROGRESS NOTE ADULT - ASSESSMENT
63 year old female with history of breast ca, on chemo here for worsening back pain.    # Intractable Back pain secondary to HNP  - SP microdisectomy POD 0  - continue pain management  - PT eval  - continue flexeril  - Cefazolin abx as per ortho    # Breast CA  - Continue anastrazole    # DVT prophylaxis  - As per ortho    
63 year old female with history of breast ca, on chemo here for worsening back pain.    # Intractable Back pain secondary to HNP  - Scheduled for microdisectomy tomorrow  - continue pain management  - PT eval  - NPO at midnight  - FU ortho recommendations  - Patient optimized for OR    # Breast CA  - Continue anastrazole    # DVT prophylaxis  - As per ortho
Pt is a 63 year old woman with a history of breast cancer and a microdiscectomy 25 year ago who follows with Dr. Mcleod In June patient develop rihgt sided sciatica following extensive breast reconstructive surgery. She had an outpatient MRI which demonstrated a recurrent HNP at Right L5/s1, she was started on oral steroids without relief. Over the last few days the pain became worse, she presented to the ED due to intractable pain. Repeat MRI confirms HNP a L5/S1. On exam pt has + SLR on right, absent achilles reflex, weakness a the right EHL and gastroc.     PLAN-  OR tomorrow, microdiscectomy at L5/S1  seen and optimized by the hospitalist   NPO after midnight  Pain meds as needed  T&S still valid   pregnancy test negative

## 2023-08-11 NOTE — DISCHARGE NOTE PROVIDER - CARE PROVIDER_API CALL
Marsha Mcleod  Orthopaedic Surgery  [address] [place] [address], [address] [place] [address]  Phone: [phone]  Fax: [phone]  Follow Up Time:

## 2023-08-11 NOTE — BRIEF OPERATIVE NOTE - NSICDXBRIEFPROCEDURE_GEN_ALL_CORE_FT
PROCEDURES:  Revision, microdiscectomy, spine, lumbar, 1 level 11-Aug-2023 11:11:21 REVISION RIGHT SIDED L5-S1 MICRODISCECTOMY Rosangela Sullivan

## 2023-08-11 NOTE — DISCHARGE NOTE PROVIDER - NSDCCPTREATMENT_GEN_ALL_CORE_FT
PRINCIPAL PROCEDURE  Procedure: Revision, microdiscectomy, spine, lumbar, 1 level  Findings and Treatment: REVISION RIGHT SIDED L5-S1 MICRODISCECTOMY

## 2023-08-11 NOTE — PROGRESS NOTE ADULT - REASON FOR ADMISSION
Intractable Lower Back Pain . Right Lower Extremity Radicular Pain

## 2023-08-11 NOTE — PROGRESS NOTE ADULT - SUBJECTIVE AND OBJECTIVE BOX
Postop Check    Patient tolerated the procedure well. Patient seen and examined at bedside. No acute complaints at this time. Pain well controlled. Denies weakness, numbness or tingling. Denies chest pain, shortness of breath, nausea or vomiting.     PE:  Vital Signs Last 24 Hrs  T(C): 36.8 (08-11-23 @ 12:08), Max: 37.1 (08-11-23 @ 10:40)  T(F): 98.2 (08-11-23 @ 12:08), Max: 98.7 (08-11-23 @ 10:40)  HR: 82 (08-11-23 @ 12:08) (69 - 82)  BP: 154/75 (08-11-23 @ 12:08) (115/94 - 161/79)  BP(mean): 98 (08-10-23 @ 15:59) (98 - 98)  RR: 17 (08-11-23 @ 12:08) (10 - 181)  SpO2: 92% (08-11-23 @ 12:08) (92% - 100%)    General: NAD, resting comfortably in bed   Dressing C/D/I  Neg espinoza  Neg Babinski  Neg Clonus        Motor:                   C5                C6              C7               C8           T1   R             5/5                5/5            5/5              5/5          5/5  L             5/5                5/5            5/5              5/5          5/5                    L2                  L3             L4              L5            S1  R            4/5***                5/5             5/5            5/5          5/5  L             5/5                5/5            5/5            5/5          5/5    ****Likely secondary to pain    Sensory:            C5         C6         C7      C8       T1        (0=absent, 1=impaired, 2=normal, NT=not testable)  R         2            2           2        2         2  L          2            2           2        2         2               L2          L3         L4      L5       S1         (0=absent, 1=impaired, 2=normal, NT=not testable)  R         2            2            2        2        2  L          2            2           2        2         2          PT/INR - ( 11 Aug 2023 03:28 )   PT: 11.4 sec;   INR: 1.01 ratio         PTT - ( 11 Aug 2023 03:28 )  PTT:30.3 sec    A/P:  63y f s/p Revision R L5/S1 microdiscectomy.     -WBAT  -Pain Control  -DVT ppx: SCDs.   -Continue perioperative abx  -Rest, ice, compress and elevate the extremity as we needed  -Incentive Spirometry  -Medical management appreciated  - Ortho stable for dc  - No further acute orthopedic surgical intervention  - Discussed plan w/ Dr. Mcleod who agrees. 
HOSPITALIST ATTENDING PROGRESS NOTE    Chart and meds reviewed.  Patient seen and examined.    CC: Back pain    Subjective: Pain well controlled. Scheduled for OR tomorrow for microdisectomy     All other systems reviewed and found to be negative with the exception of what has been described above.    MEDICATIONS  (STANDING):  acetaminophen     Tablet .. 1000 milliGRAM(s) Oral every 8 hours    MEDICATIONS  (PRN):  cyclobenzaprine 5 milliGRAM(s) Oral three times a day PRN Muscle Spasm  HYDROmorphone  Injectable 0.5 milliGRAM(s) IV Push every 4 hours PRN Severe Pain (7 - 10)  oxyCODONE    IR 5 milliGRAM(s) Oral every 4 hours PRN Mild Pain (1 - 3)  oxyCODONE    IR 10 milliGRAM(s) Oral every 4 hours PRN Moderate Pain (4 - 6)      VITALS:  T(F): 98 (08-10-23 @ 08:44), Max: 99.6 (08-09-23 @ 14:19)  HR: 71 (08-10-23 @ 08:44) (70 - 76)  BP: 130/72 (08-10-23 @ 08:44) (112/76 - 179/70)  RR: 16 (08-10-23 @ 08:44) (16 - 18)  SpO2: 95% (08-10-23 @ 08:44) (95% - 100%      PHYSICAL EXAM:  GEN: NAD  HEENT:  pupils equal and reactive, EOMI, no oropharyngeal lesions, erythema, exudates, oral thrush  NECK:   supple, no carotid bruits, no palpable lymph nodes, no thyromegaly  CV:  +S1, +S2, regular, no murmurs or rubs  RESP:   lungs clear to auscultation bilaterally, no wheezing, rales, rhonchi, good air entry bilaterally  BREAST:  not examined  GI:  abdomen soft, non-tender, non-distended, normal BS, no bruits, no abdominal masses, no palpable masses  RECTAL:  not examined  :  not examined  MSK:   normal muscle tone, no atrophy, no rigidity, no contractions  EXT:  no clubbing, no cyanosis, no edema, no calf pain, swelling or erythema  VASCULAR:  pulses equal and symmetric in the upper and lower extremities  NEURO:  AAOX3, no focal neurological deficits, follows all commands, able to move extremities spontaneously  SKIN:  no ulcers, lesions or rashes    LABS:                            12.5   5.81  )-----------( 243      ( 10 Aug 2023 08:28 )             37.2     08-10    139  |  107  |  25<H>  ----------------------------<  96  4.0   |  27  |  0.75    Ca    9.1      10 Aug 2023 08:28    TPro  7.8  /  Alb  4.1  /  TBili  0.2  /  DBili  x   /  AST  15  /  ALT  35  /  AlkPhos  91  08-09        LIVER FUNCTIONS - ( 09 Aug 2023 11:18 )  Alb: 4.1 g/dL / Pro: 7.8 gm/dL / ALK PHOS: 91 U/L / ALT: 35 U/L / AST: 15 U/L / GGT: x           PT/INR - ( 10 Aug 2023 08:28 )   PT: 11.2 sec;   INR: 0.99 ratio    PTT - ( 10 Aug 2023 08:28 )  PTT:31.7 sec    Urinalysis Basic - ( 10 Aug 2023 08:28 )  Color: x / Appearance: x / SG: x / pH: x  Gluc: 96 mg/dL / Ketone: x  / Bili: x / Urobili: x   Blood: x / Protein: x / Nitrite: x   Leuk Esterase: x / RBC: x / WBC x   Sq Epi: x / Non Sq Epi: x / Bacteria: x    < from: MR Lumbar Spine w/wo IV Cont (08.09.23 @ 16:18) >    IMPRESSION: Note transitional anatomy with a well-defined S1-2   intervertebral disc.    Tiny LEFT foraminal L2-3 disc herniation. Small central and LEFT   foraminal L3-4 disc herniations which indent the ventral thecal sac and   narrow the LEFT neural foramen, abutting the exiting LEFT L3 nerve root.   Large RIGHT paracentral L4-5 disc herniation with inferior migration and   compression of the descending RIGHT L5 nerve root.    --- End of Report ---      < end of copied text >  < from: Xray Chest 1 View-PORTABLE IMMEDIATE (Xray Chest 1 View-PORTABLE IMMEDIATE .) (08.09.23 @ 11:18) >    IMPRESSION: Clear lungs with no acute cardiopulmonary abnormalities.    --- End of Report ---      < end of copied text >    
Patient known to me from most recent visit in the office  She has a remote (1998) history of a previous microdiscectomy for acute right sided HNP.  She had done well for man years  In June underwent an extensive breast reconstructive surgery (JAMIR)  Woke up with back and neck stiffness. Progressed to severe right sided sciatica  MRI (without Jesus) demonstrated a recurrent HNP at last motion segment (previous surgical changes) -Transitional S1/2, HNP right L5-S1  She was given oral steroids without relief, percocet and PT script.  Over the last 2 days, pain worsened and is unable to sit stand walk or sleep  Presented to ED due to intractable pain    Patient was able tolerate a contrast MRI with medications today: confirms a recurrent HNP at the same level- right L5-S1.    She has markedly pos SLR on right, absent right achilles reflex, weakness in right EHL and gastroc.    Plan is for urgent revision microdiscectomy right L5-S1 (transitional S1/2)    Reviewed risks, expectations, complications and recovery with the patient  She is aware of these from her last surgery as well  Risk of recurrent HNP, LBP, and adjacent level disease discussed    She states she is unable to tolerate her pain and wishes to proceed as soon as feasible.    Her pre-op blood work, CXR, EKG look negative...    KHALIDA Mcleod MD
HPI: Pt is a 63 year old woman with a history of breast cancer and a microdiscectomy 25 year ago who follows with Dr. Mcleod In June patient develop rihgt sided sciatica following extensive breast reconstructive surgery. She had an outpatient MRI which demonstrated a recurrent HNP at Right L5/s1, she was started on oral steroids without relief. Over the last few days the pain became worse, she presented to the ED due to intractable pain. Repeat MRI confirms HNP a L5/S1. On exam pt has + SLR on right, absent achilles reflex, weakness a the right EHL and gastroc. Plan is for OR tomorrow, microdiscectomy at L5/S1.   Pt denies changes in bowl or bladder function.     Vital Signs Last 24 Hrs  T(C): 36.7 (10 Aug 2023 08:44), Max: 37.6 (09 Aug 2023 14:19)  T(F): 98 (10 Aug 2023 08:44), Max: 99.6 (09 Aug 2023 14:19)  HR: 71 (10 Aug 2023 08:44) (71 - 76)  BP: 130/72 (10 Aug 2023 08:44) (130/72 - 179/70)  BP(mean): 80 (09 Aug 2023 16:38) (80 - 80)  RR: 16 (10 Aug 2023 08:44) (16 - 18)  SpO2: 95% (10 Aug 2023 08:44) (95% - 100%)    Parameters below as of 10 Aug 2023 08:44  Patient On (Oxygen Delivery Method): room air    MEDICATIONS  (STANDING):  acetaminophen     Tablet .. 1000 milliGRAM(s) Oral every 8 hours  polyethylene glycol 3350 17 Gram(s) Oral daily  senna 1 Tablet(s) Oral daily    MEDICATIONS  (PRN):  cyclobenzaprine 5 milliGRAM(s) Oral three times a day PRN Muscle Spasm  HYDROmorphone  Injectable 0.5 milliGRAM(s) IV Push every 4 hours PRN Severe Pain (7 - 10)  oxyCODONE    IR 10 milliGRAM(s) Oral every 4 hours PRN Moderate Pain (4 - 6)  oxyCODONE    IR 5 milliGRAM(s) Oral every 4 hours PRN Mild Pain (1 - 3)    ROS: pertinent positives in HPI, all other ROS were reviewed and are negative     PHYSICAL EXAM:  Constitutional: awake and alert  HEENT: PERRLA, EOMI, hearing intact   Neck: Supple  Respiratory: Breath sounds are clear bilaterally  Cardiovascular: S1 and S2, regular  rhythm  Gastrointestinal: soft, nontender  Extremities:  no edema  Vascular: Carotid Bruit - no  Musculoskeletal: no joint swelling/tenderness, no abnormal movements  Skin: No rashes    Neurological exam:  HF: A x O x 3. Appropriately interactive, normal affect. Speech fluent, No Aphasia or paraphasic errors. Naming /repetition intact   CN: ALPHONSE, EOMI, VFF, facial sensation normal, no NLFD, tongue midline, Palate moves equally, SCM equal bilaterally  Motor: 5/5 b/l upper ext, LLE 5/5, RLE decreased strength due to pain, weakness at Right EHL and Gastroc   Sens: decreased right foot   Reflexes: absent achilles on Right   Gait/Balance: Not tested     LABS:                         12.5   5.81  )-----------( 243      ( 10 Aug 2023 08:28 )             37.2     08-10    139  |  107  |  25<H>  ----------------------------<  96  4.0   |  27  |  0.75    Ca    9.1      10 Aug 2023 08:28    TPro  7.8  /  Alb  4.1  /  TBili  0.2  /  DBili  x   /  AST  15  /  ALT  35  /  AlkPhos  91  08-09    LIVER FUNCTIONS - ( 09 Aug 2023 11:18 )  Alb: 4.1 g/dL / Pro: 7.8 gm/dL / ALK PHOS: 91 U/L / ALT: 35 U/L / AST: 15 U/L / GGT: x           RADIOLOGY:  < from: MR Lumbar Spine w/wo IV Cont (08.09.23 @ 16:18) >  IMPRESSION: Note transitional anatomy with a well-defined S1-2 intervertebral disc.    Tiny LEFT foraminal L2-3 disc herniation. Small central and LEFT   foraminal L3-4 disc herniations which indent the ventral thecal sac and   narrow the LEFT neural foramen, abutting the exiting LEFT L3 nerve root.   Large RIGHT paracentral L4-5 disc herniation with inferior migration and   compression of the descending RIGHT L5 nerve root.      
HOSPITALIST ATTENDING PROGRESS NOTE    Chart and meds reviewed.  Patient seen and examined.    CC: Back pain    Subjective: SP microdisectomy, feels well but still with some residual right side leg pain. No fever.     All other systems reviewed and found to be negative with the exception of what has been described above.    MEDICATIONS  (STANDING):  acetaminophen     Tablet .. 1000 milliGRAM(s) Oral every 8 hours  ceFAZolin   IVPB 2000 milliGRAM(s) IV Intermittent every 8 hours  lactated ringers. 1000 milliLiter(s) (75 mL/Hr) IV Continuous <Continuous>  polyethylene glycol 3350 17 Gram(s) Oral daily  senna 1 Tablet(s) Oral daily  tranexamic acid Infusion 1 mG/kG/Hr (39.7 mL/Hr) IV Continuous <Continuous>  tranexamic acid IVPB 1000 milliGRAM(s) IV Intermittent once    MEDICATIONS  (PRN):  cyclobenzaprine 5 milliGRAM(s) Oral three times a day PRN Muscle Spasm  HYDROmorphone  Injectable 0.5 milliGRAM(s) IV Push every 4 hours PRN Severe Pain (7 - 10)  oxyCODONE    IR 5 milliGRAM(s) Oral every 4 hours PRN Mild Pain (1 - 3)  oxyCODONE    IR 10 milliGRAM(s) Oral every 4 hours PRN Moderate Pain (4 - 6)  traMADol 50 milliGRAM(s) Oral every 6 hours PRN Severe Pain (7 - 10)      VITALS:  T(F): 98.2 (08-11-23 @ 12:08), Max: 98.7 (08-11-23 @ 10:40)  HR: 82 (08-11-23 @ 12:08) (69 - 82)  BP: 154/75 (08-11-23 @ 12:08) (115/94 - 161/79)  RR: 17 (08-11-23 @ 12:08) (10 - 181)  SpO2: 92% (08-11-23 @ 12:08) (92% - 100%)  Wt(kg): --    I&O's Summary    11 Aug 2023 07:01  -  11 Aug 2023 14:42  --------------------------------------------------------  IN: 1160 mL / OUT: 0 mL / NET: 1160 mL        CAPILLARY BLOOD GLUCOSE          PHYSICAL EXAM:  GEN: NAD  HEENT:  pupils equal and reactive, EOMI, no oropharyngeal lesions, erythema, exudates, oral thrush  NECK:   supple, no carotid bruits, no palpable lymph nodes, no thyromegaly  CV:  +S1, +S2, regular, no murmurs or rubs  RESP:   lungs clear to auscultation bilaterally, no wheezing, rales, rhonchi, good air entry bilaterally  BREAST:  not examined  GI:  abdomen soft, non-tender, non-distended, normal BS, no bruits, no abdominal masses, no palpable masses  RECTAL:  not examined  :  not examined  MSK:   normal muscle tone, no atrophy, no rigidity, no contractions  EXT:  no clubbing, no cyanosis, no edema, no calf pain, swelling or erythema  VASCULAR:  pulses equal and symmetric in the upper and lower extremities  NEURO:  AAOX3, no focal neurological deficits, follows all commands, able to move extremities spontaneously  SKIN:  no ulcers, lesions or rashes    LABS:                            13.5   7.00  )-----------( 239      ( 11 Aug 2023 12:02 )             39.6     08-11    137  |  105  |  25<H>  ----------------------------<  133<H>  3.9   |  26  |  0.98    Ca    9.1      11 Aug 2023 12:02        PT/INR - ( 11 Aug 2023 03:28 )   PT: 11.4 sec;   INR: 1.01 ratio     PTT - ( 11 Aug 2023 03:28 )  PTT:30.3 sec    Urinalysis Basic - ( 11 Aug 2023 12:02 )  Color: x / Appearance: x / SG: x / pH: x  Gluc: 133 mg/dL / Ketone: x  / Bili: x / Urobili: x   Blood: x / Protein: x / Nitrite: x   Leuk Esterase: x / RBC: x / WBC x   Sq Epi: x / Non Sq Epi: x / Bacteria: x

## 2023-08-11 NOTE — DISCHARGE NOTE PROVIDER - NSDCMRMEDTOKEN_GEN_ALL_CORE_FT
anastrozole 1 mg oral tablet: 1 tab(s) orally once a day  cholecalciferol 25 mcg (1000 intl units) oral tablet: 1 tab(s) orally once a day  Colace 100 mg oral capsule: 1 cap(s) orally 4 times a day as needed for  constipation  cyanocobalamin 1000 mcg oral tablet: 2 tab(s) orally once a day  cyclobenzaprine 5 mg oral tablet: 1 tab(s) orally 3 times a day as needed for  muscle spasm  gabapentin 100 mg oral capsule: 1 cap(s) orally 3 times a day  Multiple Vitamins oral tablet: 1 tab(s) orally once a day  ondansetron 4 mg oral tablet: 1 tab(s) orally every 4 hours as needed for  nausea  oxyCODONE 5 mg oral tablet: 1 tab(s) orally every 6 hours as needed for -for severe pain MDD: 4  Oyster Antony 500 oral tablet: 1 tab(s) orally once a day  Restasis 0.05% ophthalmic emulsion: 1 drop(s) in each affected eye once a day

## 2023-08-11 NOTE — DISCHARGE NOTE PROVIDER - HOSPITAL COURSE
The patient is a 63 year old Female status post Revision L5-S1 Microdiscectomy for intractable lower back pain. The patient was seen outpatient and deemed medically optimized for the previously mentioned surgical procedure. The patient was taken to the operating room on date mentioned above. Prophylactic antibiotics were started before the procedure and continued for 24 hours or until drain removed. There were no complications during the procedure and the patient tolerated the procedure well. The patient was transfered to the recovery room in stable condition and subsequently to the surgical floor. The patient was given SCDs for DVT prophylaxis. All home medications were continued. The patient received physical therapy daily and daily labs were followed. The dressing was kept clean, dry, intact. The rest of the hospital stay was unremarkable. The patient was discharged in stable condition to follow up outpatient.

## 2023-08-16 LAB — SURGICAL PATHOLOGY STUDY: SIGNIFICANT CHANGE UP

## 2023-08-19 DIAGNOSIS — E66.9 OBESITY, UNSPECIFIED: ICD-10-CM

## 2023-08-19 DIAGNOSIS — Z90.710 ACQUIRED ABSENCE OF BOTH CERVIX AND UTERUS: ICD-10-CM

## 2023-08-19 DIAGNOSIS — Z92.21 PERSONAL HISTORY OF ANTINEOPLASTIC CHEMOTHERAPY: ICD-10-CM

## 2023-08-19 DIAGNOSIS — C50.919 MALIGNANT NEOPLASM OF UNSPECIFIED SITE OF UNSPECIFIED FEMALE BREAST: ICD-10-CM

## 2023-08-19 DIAGNOSIS — G96.198 OTHER DISORDERS OF MENINGES, NOT ELSEWHERE CLASSIFIED: ICD-10-CM

## 2023-08-19 DIAGNOSIS — Z90.13 ACQUIRED ABSENCE OF BILATERAL BREASTS AND NIPPLES: ICD-10-CM

## 2023-08-19 DIAGNOSIS — Z79.899 OTHER LONG TERM (CURRENT) DRUG THERAPY: ICD-10-CM

## 2023-08-19 DIAGNOSIS — M51.17 INTERVERTEBRAL DISC DISORDERS WITH RADICULOPATHY, LUMBOSACRAL REGION: ICD-10-CM

## 2024-10-02 NOTE — ED PROVIDER NOTE - CADM POA URETHRAL CATHETER
Pre-Endoscopy History and Physical     Marlen Solano MRN# [id number]   YOB: 1971 Age: 53 year old     Date of Procedure: 10/2/2024  Primary care provider: Dylan Aamto  Type of Endoscopy: Colonoscopy with possible biopsy, possible polypectomy  Reason for Procedure: screen  Type of Anesthesia Anticipated: Conscious Sedation    HPI:    Marlen is a 53 year old female who will be undergoing the above procedure.      A history and physical has been performed. The patient's medications and allergies have been reviewed. The risks and benefits of the procedure and the sedation options and risks were discussed with the patient.  All questions were answered and informed consent was obtained.      She denies a personal or family history of anesthesia complications or bleeding disorders.     Patient Active Problem List   Diagnosis    CARDIOVASCULAR SCREENING; LDL GOAL LESS THAN 130    Atypical lobular hyperplasia (ALH) of right breast    Benign essential hypertension    Diabetes mellitus, type 2 (H)        Past Medical History:   Diagnosis Date    Lump of right breast         Past Surgical History:   Procedure Laterality Date    ABDOMEN SURGERY          ABDOMEN SURGERY          ABDOMEN SURGERY          EXCISE LESION TRUNK Left 10/19/2018    Procedure: EXCISE LESION TRUNK;  Surgeon: Hosea Fernandez MD;  Location: [facility] OR    LUMPECTOMY BREAST WITH SEED LOCALIZATION Right 10/19/2018    Procedure: right breast seed localized biopsy with excision left breast skin lesion;  Surgeon: Hosea Fernandez MD;  Location: [facility] OR       Social History     Tobacco Use    Smoking status: Never    Smokeless tobacco: Never   Substance Use Topics    Alcohol use: No     Alcohol/week: 0.0 standard drinks of alcohol       Family History   Problem Relation Age of Onset    Glaucoma Mother     Family History Negative Mother     Cerebrovascular Disease Father     Hypertension Father     Glaucoma  "Maternal Grandmother     Diabetes Paternal Grandfather     Hypertension Brother     Macular Degeneration No family hx of        Prior to Admission medications    Medication Sig Start Date End Date Taking? Authorizing Provider   atorvastatin (LIPITOR) 20 MG tablet Take 1 tablet (20 mg) by mouth daily 9/28/23   James Gray MD   bisacodyl (DULCOLAX) 5 MG EC tablet Take 2 tablets at 3 pm the day before your procedure. If your procedure is before 11 am, take 2 additional tablets at 11 pm. If your procedure is after 11 am, take 2 additional tablets at 6 am. For additional instructions refer to your colonoscopy prep instructions. 9/11/24   Dylan Amato MD   losartan (COZAAR) 50 MG tablet Take 1 tablet (50 mg) by mouth daily 8/19/24   Dylan Amato MD   metFORMIN (GLUCOPHAGE) 500 MG tablet Take 1 tablet (500 mg) by mouth 2 times daily (with meals) 9/28/23   James Gray MD   polyethylene glycol (GOLYTELY) 236 g suspension The night before the exam at 6 pm drink an 8-ounce glass every 15 minutes until the jug is half empty. If you arrive before 11 AM: Drink the other half of the Golytely jug at 11 PM night before procedure. If you arrive after 11 AM: Drink the other half of the Golytely jug at 6 AM day of procedure. For additional instructions refer to your colonoscopy prep instructions. 9/11/24   Dylan Amato MD   tamoxifen (NOLVADEX) 20 MG tablet Take 1 tablet (20 mg) by mouth daily 7/20/23   Linda Franks MD       No Known Allergies     REVIEW OF SYSTEMS:   5 point ROS negative except as noted above in HPI, including Gen., Resp., CV, GI &  system review.    PHYSICAL EXAM:   There were no vitals taken for this visit. Estimated body mass index is 31.93 kg/m  as calculated from the following:    Height as of 8/19/24: 1.676 m (5' 6\").    Weight as of 8/19/24: 89.7 kg (197 lb 12.8 oz).   GENERAL APPEARANCE: alert, and oriented  MENTAL STATUS: alert  AIRWAY EXAM: " Mallampatti Class I (visualization of the soft palate, fauces, uvula, anterior and posterior pillars)  RESP: lungs clear to auscultation - no rales, rhonchi or wheezes  CV: regular rates and rhythm  DIAGNOSTICS:    Not indicated    IMPRESSION   ASA Class 2 - Mild systemic disease    PLAN:   Plan for Colonoscopy with possible biopsy, possible polypectomy. We discussed the risks, benefits and alternatives and the patient wished to proceed.    The above has been forwarded to the consulting provider.      Signed Electronically by: Enzo Cedillo MD  October 2, 2024           No